# Patient Record
Sex: FEMALE | Race: WHITE | URBAN - METROPOLITAN AREA
[De-identification: names, ages, dates, MRNs, and addresses within clinical notes are randomized per-mention and may not be internally consistent; named-entity substitution may affect disease eponyms.]

---

## 2021-06-30 ENCOUNTER — OFFICE VISIT (OUTPATIENT)
Dept: URBAN - METROPOLITAN AREA CLINIC 85 | Facility: CLINIC | Age: 61
End: 2021-06-30
Payer: MEDICARE

## 2021-06-30 PROCEDURE — 92134 CPTRZ OPH DX IMG PST SGM RTA: CPT | Performed by: OPTOMETRIST

## 2021-06-30 PROCEDURE — 92004 COMPRE OPH EXAM NEW PT 1/>: CPT | Performed by: OPTOMETRIST

## 2021-06-30 ASSESSMENT — KERATOMETRY
OD: 45.00
OS: 45.38

## 2021-06-30 ASSESSMENT — INTRAOCULAR PRESSURE
OD: 14
OS: 13

## 2021-06-30 ASSESSMENT — VISUAL ACUITY
OD: 20/25
OS: 20/20

## 2021-06-30 NOTE — IMPRESSION/PLAN
Impression: Type 1 diab w mild nonprlf diabetic rtnop w/o macular edema, bilateral: K20.6801. Plan: Diabetes type I: mild background diabetic retinopathy, no signs of neovascularization noted. No treatment necessary at this time. Patient was instructed to monitor vision for sudden changes and to call if any changes noted. Discussed ocular and systemic benefits of blood sugar control.

## 2021-06-30 NOTE — IMPRESSION/PLAN
Impression: Age-related nuclear cataract, bilateral: H25.13. Plan: Discussed findings. Discussed option of CE/IOL OU. Patient understands cataract effect on vision. Patient defers to have  CE w/IOL consult with Dr. Cory Bateman at this time. Continue to monitor.

## 2021-07-20 ENCOUNTER — OFFICE VISIT (OUTPATIENT)
Dept: URBAN - METROPOLITAN AREA CLINIC 85 | Facility: CLINIC | Age: 61
End: 2021-07-20
Payer: MEDICARE

## 2021-07-20 DIAGNOSIS — H35.372 PUCKERING OF MACULA, LEFT EYE: Primary | ICD-10-CM

## 2021-07-20 DIAGNOSIS — H35.3132 NONEXUDATIVE AGE-RELATED MACULAR DEGENERATION, BILATERAL, INTERMEDIATE DRY STAGE: ICD-10-CM

## 2021-07-20 DIAGNOSIS — E10.3293 TYPE 1 DIABETES MELLITUS WITH MILD NONPROLIFERATIVE DIABETIC RETINOPATHY WITHOUT MACULAR EDEMA, BILATERAL: ICD-10-CM

## 2021-07-20 PROCEDURE — 92134 CPTRZ OPH DX IMG PST SGM RTA: CPT | Performed by: OPHTHALMOLOGY

## 2021-07-20 PROCEDURE — 92235 FLUORESCEIN ANGRPH MLTIFRAME: CPT | Performed by: OPHTHALMOLOGY

## 2021-07-20 PROCEDURE — 92004 COMPRE OPH EXAM NEW PT 1/>: CPT | Performed by: OPHTHALMOLOGY

## 2021-07-20 NOTE — IMPRESSION/PLAN
Impression: Type 1 diabetes mellitus with mild nonproliferative diabetic retinopathy without macular edema, bilateral: X67.0913. Plan:  No NV on FA , mild NPDR s ME Observe now BG/BP control Note to PCP  6m OCT/exam

## 2021-07-20 NOTE — IMPRESSION/PLAN
Impression: Nonexudative age-related macular degeneration, bilateral, intermediate dry stage: H35.3132. Plan:  No CNVM on FA
  AREDS2/amsler  6m OCT/exam

## 2022-03-08 ENCOUNTER — OFFICE VISIT (OUTPATIENT)
Dept: URBAN - METROPOLITAN AREA CLINIC 85 | Facility: CLINIC | Age: 62
End: 2022-03-08
Payer: MEDICARE

## 2022-03-08 DIAGNOSIS — H43.812 VITREOUS DEGENERATION, LEFT EYE: ICD-10-CM

## 2022-03-08 DIAGNOSIS — H43.391 OTHER VITREOUS OPACITIES, RIGHT EYE: ICD-10-CM

## 2022-03-08 DIAGNOSIS — H25.13 AGE-RELATED NUCLEAR CATARACT, BILATERAL: ICD-10-CM

## 2022-03-08 DIAGNOSIS — Z79.4 LONG TERM (CURRENT) USE OF INSULIN: ICD-10-CM

## 2022-03-08 PROCEDURE — 92201 OPSCPY EXTND RTA DRAW UNI/BI: CPT | Performed by: OPTOMETRIST

## 2022-03-08 PROCEDURE — 92134 CPTRZ OPH DX IMG PST SGM RTA: CPT | Performed by: OPTOMETRIST

## 2022-03-08 PROCEDURE — 92014 COMPRE OPH EXAM EST PT 1/>: CPT | Performed by: OPTOMETRIST

## 2022-03-08 ASSESSMENT — INTRAOCULAR PRESSURE
OS: 14
OD: 15

## 2022-03-08 ASSESSMENT — VISUAL ACUITY
OS: 20/40
OD: 20/30

## 2022-03-08 ASSESSMENT — KERATOMETRY
OS: 44.88
OD: 44.63

## 2022-03-08 NOTE — IMPRESSION/PLAN
Impression: Age-related nuclear cataract, bilateral: H25.13. Plan: Discussed findings. Discussed option of CE/IOL OU. Patient understands cataract effect on vision. Patient to get clearance from Dr. Norma Mills for cataract surgery.

## 2022-03-08 NOTE — IMPRESSION/PLAN
Impression: Type 1 diabetes mellitus with mild nonproliferative diabetic retinopathy without macular edema, bilateral: M20.0243. Plan: Discussed mild  diabetic retinopathy. Recommend yearly diabetic eye exam. Discussed with patient importance of good blood sugar control and compliance with regular visits with PCP, compliance with medications, healthy diet and daily exercise.

## 2022-03-10 ENCOUNTER — APPOINTMENT (RX ONLY)
Dept: URBAN - METROPOLITAN AREA CLINIC 68 | Facility: CLINIC | Age: 62
Setting detail: DERMATOLOGY
End: 2022-03-10

## 2022-03-28 ENCOUNTER — OFFICE VISIT (OUTPATIENT)
Dept: URBAN - METROPOLITAN AREA CLINIC 85 | Facility: CLINIC | Age: 62
End: 2022-03-28
Payer: MEDICARE

## 2022-03-28 PROCEDURE — 92134 CPTRZ OPH DX IMG PST SGM RTA: CPT | Performed by: OPHTHALMOLOGY

## 2022-03-28 PROCEDURE — 92014 COMPRE OPH EXAM EST PT 1/>: CPT | Performed by: OPHTHALMOLOGY

## 2022-03-28 ASSESSMENT — INTRAOCULAR PRESSURE
OD: 14
OS: 14

## 2022-03-28 NOTE — IMPRESSION/PLAN
Impression: Age-related nuclear cataract, bilateral: H25.13.  Plan: The patient will obtain a cat eval.

## 2022-03-28 NOTE — IMPRESSION/PLAN
Impression: Puckering of macula, left eye: H35.372. Plan: The exam and oct show that the patient has a moderate ERM OS. The risks and benefits of surgical intervention vs observation was discussed with the patient and she will have her cataracts removed then return to retina in 3-4 months for a dilated follow up and possible oct.

## 2022-04-28 ENCOUNTER — PRE-OPERATIVE VISIT (OUTPATIENT)
Dept: URBAN - METROPOLITAN AREA CLINIC 85 | Facility: CLINIC | Age: 62
End: 2022-04-28
Payer: MEDICARE

## 2022-04-28 DIAGNOSIS — H35.372 PUCKERING OF MACULA, LEFT EYE: ICD-10-CM

## 2022-04-28 DIAGNOSIS — E10.3293 TYPE 1 DIABETES MELLITUS WITH MILD NONPROLIFERATIVE DIABETIC RETINOPATHY WITHOUT MACULAR EDEMA, BILATERAL: ICD-10-CM

## 2022-04-28 DIAGNOSIS — H04.123 DRY EYE SYNDROME OF BILATERAL LACRIMAL GLANDS: ICD-10-CM

## 2022-04-28 DIAGNOSIS — H35.3132 NONEXUDATIVE AGE-RELATED MACULAR DEGENERATION, BILATERAL, INTERMEDIATE DRY STAGE: ICD-10-CM

## 2022-04-28 DIAGNOSIS — H43.391 OTHER VITREOUS OPACITIES, RIGHT EYE: ICD-10-CM

## 2022-04-28 DIAGNOSIS — H43.812 VITREOUS DEGENERATION, LEFT EYE: ICD-10-CM

## 2022-04-28 PROCEDURE — 99214 OFFICE O/P EST MOD 30 MIN: CPT | Performed by: OPHTHALMOLOGY

## 2022-04-28 ASSESSMENT — INTRAOCULAR PRESSURE
OS: 15
OD: 15

## 2022-04-28 ASSESSMENT — KERATOMETRY
OS: 44.88
OD: 44.63

## 2022-04-28 ASSESSMENT — VISUAL ACUITY
OD: 20/50
OS: 20/40

## 2022-04-28 NOTE — IMPRESSION/PLAN
Impression: Dry eye syndrome of bilateral lacrimal glands: H04.123. Plan: There is no evidence of permanent changes to the cornea. Explained condition does not have a cure and will need artificial tears for maintenance. Recommend patient use Artificial Tear drops QID and Artificial Tear gel  QHS OU. Discussed with patient, understands this may limit vision after surgery.

## 2022-04-28 NOTE — IMPRESSION/PLAN
Impression: Type 1 diabetes mellitus with mild nonproliferative diabetic retinopathy without macular edema, bilateral: Z15.3645. Plan: Mild NPDR, no signs of neovascularization noted. No treatment necessary at this time. Patient was instructed to monitor vision for sudden changes and to call if visual changes noted. Discussed ocular and systemic benefits of blood sugar control, poor compliance can lead to blindness. Discussed with patient, understands this may limit vision after surgery.

## 2022-04-28 NOTE — IMPRESSION/PLAN
Impression: Age-related nuclear cataract, bilateral: H25.13. Plan: Discussed cataract diagnosis with the patient. Discussed risks, benefits and alternatives to surgery including but not limited to: bleeding, infection, risk of vision loss, loss of the eye, need for other surgery. Patient voiced understanding and wishes to proceed. Patient elects surgical treatment. Advanced technology options Discussed with patient . Patient desires surgery OU, OS  first (( AIM - 0.25 OU, DEXYCU, Topical anesthesia, no Lensx, no LRI, ORA OU)) Patient understands the need for glasses after surgery for BCVA.

## 2022-04-28 NOTE — IMPRESSION/PLAN
Impression: Nonexudative age-related macular degeneration, bilateral, intermediate dry stage: H35.3132. Plan: undilated exam, Macular degeneration, dry type, appears progressed with decreased vision. Discussed diagnosis in detail with patient. Use of vitamins has shown to improve the effects of ARMD. Discussed treatment options with patient. Counseling about the benefits and/or risks of the Age-Related Eye Disease Study (AREDS) formulation for preventing progression of age-related macular degeneration (AMD) was provided to the patient. Advised patient to eat green leafy vegetables. Use of Amsler grid was explained. No fluid or heme noted. Will continue to monitor vision and the patient has been instructed to call with any vision changes. Discussed with patient, understands this may limit vision after surgery.

## 2022-04-28 NOTE — IMPRESSION/PLAN
Impression: Puckering of macula, left eye: H35.372. Plan: followed by retina. Undilated exam. There is no evidence of retinal pathology. All signs and risks of retinal detachment and tears were discussed in detail. Patient instructed to call the office immediately if any symptoms noted. Recommend the patient return to office for follow up. Discussed with patient, understands this may limit vision after surgery.

## 2022-05-03 ENCOUNTER — ADULT PHYSICAL (OUTPATIENT)
Dept: URBAN - METROPOLITAN AREA CLINIC 85 | Facility: CLINIC | Age: 62
End: 2022-05-03
Payer: MEDICARE

## 2022-05-03 DIAGNOSIS — Z01.818 ENCOUNTER FOR OTHER PREPROCEDURAL EXAMINATION: Primary | ICD-10-CM

## 2022-05-03 DIAGNOSIS — H25.13 AGE-RELATED NUCLEAR CATARACT, BILATERAL: ICD-10-CM

## 2022-05-03 PROCEDURE — 99203 OFFICE O/P NEW LOW 30 MIN: CPT | Performed by: PHYSICIAN ASSISTANT

## 2022-05-05 ENCOUNTER — SURGERY (OUTPATIENT)
Dept: URBAN - METROPOLITAN AREA SURGERY 55 | Facility: SURGERY | Age: 62
End: 2022-05-05
Payer: MEDICARE

## 2022-05-05 PROCEDURE — PR1CP PR1CP: CUSTOM | Performed by: OPHTHALMOLOGY

## 2022-05-05 PROCEDURE — 66984 XCAPSL CTRC RMVL W/O ECP: CPT | Performed by: OPHTHALMOLOGY

## 2022-05-06 ENCOUNTER — POST-OPERATIVE VISIT (OUTPATIENT)
Dept: URBAN - METROPOLITAN AREA CLINIC 85 | Facility: CLINIC | Age: 62
End: 2022-05-06
Payer: MEDICARE

## 2022-05-06 PROCEDURE — 99024 POSTOP FOLLOW-UP VISIT: CPT | Performed by: OPHTHALMOLOGY

## 2022-05-06 ASSESSMENT — INTRAOCULAR PRESSURE: OS: 21

## 2022-05-12 ENCOUNTER — POST-OPERATIVE VISIT (OUTPATIENT)
Dept: URBAN - METROPOLITAN AREA CLINIC 85 | Facility: CLINIC | Age: 62
End: 2022-05-12
Payer: MEDICARE

## 2022-05-12 PROCEDURE — 99024 POSTOP FOLLOW-UP VISIT: CPT | Performed by: OPHTHALMOLOGY

## 2022-05-12 RX ORDER — PREDNISOLONE ACETATE 10 MG/ML
1 % SUSPENSION/ DROPS OPHTHALMIC
Qty: 1 | Refills: 1 | Status: ACTIVE
Start: 2022-05-12

## 2022-05-12 ASSESSMENT — VISUAL ACUITY
OD: 20/50
OS: 20/40

## 2022-05-12 ASSESSMENT — INTRAOCULAR PRESSURE
OS: 15
OD: 15

## 2022-05-12 NOTE — IMPRESSION/PLAN
Impression: S/P Cataract Extraction by phacoemulsification with IOL placement OS - 7 Days. Encounter for surgical aftercare following surgery on a sense organ  Z48.810. Discussed residual inflammation. Initiate Prednisolone OS QID. Plan: The patient was instructed to contact their eye care provider ASAP if there should be any decrease in vision, pain, or any worsening of condition.

## 2022-05-18 ENCOUNTER — POST-OPERATIVE VISIT (OUTPATIENT)
Dept: URBAN - METROPOLITAN AREA CLINIC 85 | Facility: CLINIC | Age: 62
End: 2022-05-18
Payer: MEDICARE

## 2022-05-18 DIAGNOSIS — Z48.810 ENCOUNTER FOR SURGICAL AFTERCARE FOLLOWING SURGERY ON A SENSE ORGAN: Primary | ICD-10-CM

## 2022-05-18 PROCEDURE — 99024 POSTOP FOLLOW-UP VISIT: CPT | Performed by: OPHTHALMOLOGY

## 2022-05-18 ASSESSMENT — VISUAL ACUITY: OS: 20/60

## 2022-05-18 ASSESSMENT — INTRAOCULAR PRESSURE: OS: 11

## 2022-05-18 NOTE — IMPRESSION/PLAN
Impression: S/P Cataract Extraction by phacoemulsification with IOL placement OS - 13 Days. Encounter for surgical aftercare following surgery on a sense organ  Z48.810. Plan: Discussed PVD OS with patient and advised to cancel surgery for now OD. Return 4 weeks for FU PVD OS.  Continue all drops for now

## 2022-06-01 ENCOUNTER — OFFICE VISIT (OUTPATIENT)
Dept: URBAN - METROPOLITAN AREA CLINIC 85 | Facility: CLINIC | Age: 62
End: 2022-06-01
Payer: MEDICARE

## 2022-06-01 DIAGNOSIS — Z48.810 ENCOUNTER FOR SURGICAL AFTERCARE FOLLOWING SURGERY ON A SENSE ORGAN: Primary | ICD-10-CM

## 2022-06-01 PROCEDURE — 99024 POSTOP FOLLOW-UP VISIT: CPT | Performed by: OPHTHALMOLOGY

## 2022-06-01 ASSESSMENT — INTRAOCULAR PRESSURE
OD: 14
OS: 11

## 2022-06-01 NOTE — IMPRESSION/PLAN
Impression: S/P Cataract Extraction by phacoemulsification with IOL placement OS - 13 Days. Encounter for surgical aftercare following surgery on a sense organ  Z48.810.  Plan: Inflammation resolved Taper prednisolone BID for 1 week and QD for 1 week then D/C

## 2022-06-15 ENCOUNTER — POST-OPERATIVE VISIT (OUTPATIENT)
Dept: URBAN - METROPOLITAN AREA CLINIC 85 | Facility: CLINIC | Age: 62
End: 2022-06-15
Payer: MEDICARE

## 2022-06-15 DIAGNOSIS — Z48.810 ENCOUNTER FOR SURGICAL AFTERCARE FOLLOWING SURGERY ON A SENSE ORGAN: Primary | ICD-10-CM

## 2022-06-15 PROCEDURE — 99024 POSTOP FOLLOW-UP VISIT: CPT | Performed by: OPHTHALMOLOGY

## 2022-06-15 ASSESSMENT — INTRAOCULAR PRESSURE
OD: 14
OS: 15

## 2022-06-15 ASSESSMENT — VISUAL ACUITY
OD: 20/30
OS: 20/40

## 2022-06-15 NOTE — IMPRESSION/PLAN
Attempted to reach patient, left detailed message of personal cell regarding Dr. Ananya Velez' note and recommendations. Requested she call back if she had any questions. Impression: S/P Cataract Extraction by phacoemulsification with IOL placement OS - 41 Days. Encounter for surgical aftercare following surgery on a sense organ  Z48.810. Plan: Use Prednisolone OS QD for 1 week and tears OU QID.   Return as scheduled with Dr. Daniel Forde

## 2022-06-27 ENCOUNTER — OFFICE VISIT (OUTPATIENT)
Dept: URBAN - METROPOLITAN AREA CLINIC 85 | Facility: CLINIC | Age: 62
End: 2022-06-27
Payer: MEDICARE

## 2022-06-27 DIAGNOSIS — E10.3293 TYPE 1 DIABETES MELLITUS WITH MILD NONPROLIFERATIVE DIABETIC RETINOPATHY WITHOUT MACULAR EDEMA, BILATERAL: Primary | ICD-10-CM

## 2022-06-27 DIAGNOSIS — H35.372 PUCKERING OF MACULA, LEFT EYE: ICD-10-CM

## 2022-06-27 PROCEDURE — 92134 CPTRZ OPH DX IMG PST SGM RTA: CPT | Performed by: OPHTHALMOLOGY

## 2022-06-27 PROCEDURE — 92014 COMPRE OPH EXAM EST PT 1/>: CPT | Performed by: OPHTHALMOLOGY

## 2022-06-27 ASSESSMENT — INTRAOCULAR PRESSURE
OD: 14
OS: 13

## 2022-06-27 NOTE — IMPRESSION/PLAN
Impression: Puckering of macula, left eye: H35.372. Plan: The exam and oct show that the patient has a moderate ERM OS. The findings were discussed with the patient, along with the risks and benefits of a vitrectomy/MS/air OS and the patient will let us know if they would like to be scheduled for surgery.

## 2022-07-06 ENCOUNTER — ADULT PHYSICAL (OUTPATIENT)
Dept: URBAN - METROPOLITAN AREA CLINIC 85 | Facility: CLINIC | Age: 62
End: 2022-07-06
Payer: MEDICARE

## 2022-07-06 DIAGNOSIS — Z01.818 ENCOUNTER FOR OTHER PREPROCEDURAL EXAMINATION: Primary | ICD-10-CM

## 2022-07-06 DIAGNOSIS — H43.812 VITREOUS DEGENERATION, LEFT EYE: ICD-10-CM

## 2022-07-06 PROCEDURE — 99213 OFFICE O/P EST LOW 20 MIN: CPT | Performed by: PHYSICIAN ASSISTANT

## 2022-07-18 ENCOUNTER — OFFICE VISIT (OUTPATIENT)
Dept: URBAN - METROPOLITAN AREA CLINIC 85 | Facility: CLINIC | Age: 62
End: 2022-07-18
Payer: MEDICARE

## 2022-07-18 DIAGNOSIS — H02.831 DERMATOCHALASIS OF RIGHT UPPER EYELID: ICD-10-CM

## 2022-07-18 DIAGNOSIS — H52.4 PRESBYOPIA: ICD-10-CM

## 2022-07-18 DIAGNOSIS — H02.834 DERMATOCHALASIS OF LEFT UPPER EYELID: ICD-10-CM

## 2022-07-18 DIAGNOSIS — H35.372 PUCKERING OF MACULA, LEFT EYE: Primary | ICD-10-CM

## 2022-07-18 PROCEDURE — 92012 INTRM OPH EXAM EST PATIENT: CPT | Performed by: OPHTHALMOLOGY

## 2022-07-18 ASSESSMENT — VISUAL ACUITY
OS: 20/25
OD: 20/25

## 2022-07-18 ASSESSMENT — INTRAOCULAR PRESSURE
OS: 10
OD: 15

## 2022-07-18 NOTE — IMPRESSION/PLAN
Impression: Dermatochalasis of right upper eyelid: H02.831. Plan: discussed the possible surgical management of drooping eyelids as well as the risks and benefits including but not limited to the possible need for revision at some future point. Recommend consult and testing with Dr. Sarika Burton in [facility] if desired to proceed.

## 2022-07-18 NOTE — IMPRESSION/PLAN
Impression: Puckering of macula, left eye: H35.372. Plan: Patient defers surgical intervention at this time as she is moving. Patient will see an Ophthalmologist when she moves.

## 2025-01-20 ENCOUNTER — OFFICE VISIT (OUTPATIENT)
Dept: URBAN - METROPOLITAN AREA CLINIC 85 | Facility: CLINIC | Age: 65
End: 2025-01-20
Payer: MEDICARE

## 2025-01-20 DIAGNOSIS — H43.391 OTHER VITREOUS OPACITIES, RIGHT EYE: ICD-10-CM

## 2025-01-20 DIAGNOSIS — E11.3293 DIABETES MELLITUS TYPE 2 WITH MILD NON-PROLIFERATIVE RETINOPATHY WITHOUT MACULAR EDEMA, BILATERAL: ICD-10-CM

## 2025-01-20 DIAGNOSIS — H43.812 VITREOUS DEGENERATION, LEFT EYE: ICD-10-CM

## 2025-01-20 DIAGNOSIS — H04.123 TEAR FILM INSUFFICIENCY OF BILATERAL LACRIMAL GLANDS: ICD-10-CM

## 2025-01-20 DIAGNOSIS — H35.372 PUCKERING OF MACULA, LEFT EYE: Primary | ICD-10-CM

## 2025-01-20 DIAGNOSIS — Z79.4 LONG TERM (CURRENT) USE OF INSULIN: ICD-10-CM

## 2025-01-20 DIAGNOSIS — H25.11 AGE-RELATED NUCLEAR CATARACT, RIGHT EYE: ICD-10-CM

## 2025-01-20 PROCEDURE — 99214 OFFICE O/P EST MOD 30 MIN: CPT | Performed by: OPTOMETRIST

## 2025-01-20 PROCEDURE — 92134 CPTRZ OPH DX IMG PST SGM RTA: CPT | Performed by: OPTOMETRIST

## 2025-01-20 ASSESSMENT — VISUAL ACUITY
OD: 20/25
OS: 20/25

## 2025-01-20 ASSESSMENT — INTRAOCULAR PRESSURE
OD: 12
OS: 11